# Patient Record
Sex: FEMALE | Race: BLACK OR AFRICAN AMERICAN | NOT HISPANIC OR LATINO | Employment: UNEMPLOYED | URBAN - METROPOLITAN AREA
[De-identification: names, ages, dates, MRNs, and addresses within clinical notes are randomized per-mention and may not be internally consistent; named-entity substitution may affect disease eponyms.]

---

## 2024-07-16 ENCOUNTER — APPOINTMENT (OUTPATIENT)
Dept: RADIOLOGY | Facility: HOSPITAL | Age: 34
End: 2024-07-16
Payer: COMMERCIAL

## 2024-07-16 ENCOUNTER — HOSPITAL ENCOUNTER (EMERGENCY)
Facility: HOSPITAL | Age: 34
Discharge: HOME | End: 2024-07-16
Payer: COMMERCIAL

## 2024-07-16 VITALS
RESPIRATION RATE: 18 BRPM | TEMPERATURE: 96.9 F | OXYGEN SATURATION: 98 % | HEART RATE: 87 BPM | SYSTOLIC BLOOD PRESSURE: 158 MMHG | DIASTOLIC BLOOD PRESSURE: 99 MMHG | WEIGHT: 250 LBS

## 2024-07-16 DIAGNOSIS — S62.307A CLOSED NONDISPLACED FRACTURE OF FIFTH METACARPAL BONE OF LEFT HAND, UNSPECIFIED PORTION OF METACARPAL, INITIAL ENCOUNTER: Primary | ICD-10-CM

## 2024-07-16 PROCEDURE — 73130 X-RAY EXAM OF HAND: CPT | Mod: LEFT SIDE | Performed by: STUDENT IN AN ORGANIZED HEALTH CARE EDUCATION/TRAINING PROGRAM

## 2024-07-16 PROCEDURE — 99284 EMERGENCY DEPT VISIT MOD MDM: CPT | Performed by: PHYSICIAN ASSISTANT

## 2024-07-16 PROCEDURE — 73110 X-RAY EXAM OF WRIST: CPT | Mod: LEFT SIDE | Performed by: STUDENT IN AN ORGANIZED HEALTH CARE EDUCATION/TRAINING PROGRAM

## 2024-07-16 PROCEDURE — 73110 X-RAY EXAM OF WRIST: CPT | Mod: LT

## 2024-07-16 PROCEDURE — 99284 EMERGENCY DEPT VISIT MOD MDM: CPT | Mod: 25

## 2024-07-16 PROCEDURE — 29125 APPL SHORT ARM SPLINT STATIC: CPT | Performed by: PHYSICIAN ASSISTANT

## 2024-07-16 PROCEDURE — 2500000001 HC RX 250 WO HCPCS SELF ADMINISTERED DRUGS (ALT 637 FOR MEDICARE OP): Performed by: PHYSICIAN ASSISTANT

## 2024-07-16 PROCEDURE — 73130 X-RAY EXAM OF HAND: CPT | Mod: LT

## 2024-07-16 PROCEDURE — 29075 APPL CST ELBW FNGR SHORT ARM: CPT | Performed by: PHYSICIAN ASSISTANT

## 2024-07-16 RX ORDER — IBUPROFEN 600 MG/1
600 TABLET ORAL ONCE
Status: COMPLETED | OUTPATIENT
Start: 2024-07-16 | End: 2024-07-16

## 2024-07-16 RX ORDER — ACETAMINOPHEN 500 MG
1000 TABLET ORAL EVERY 8 HOURS PRN
Qty: 30 TABLET | Refills: 0 | Status: SHIPPED | OUTPATIENT
Start: 2024-07-16 | End: 2024-07-26

## 2024-07-16 RX ORDER — IBUPROFEN 600 MG/1
600 TABLET ORAL EVERY 6 HOURS PRN
Qty: 28 TABLET | Refills: 0 | Status: SHIPPED | OUTPATIENT
Start: 2024-07-16 | End: 2024-07-23

## 2024-07-16 ASSESSMENT — COLUMBIA-SUICIDE SEVERITY RATING SCALE - C-SSRS
2. HAVE YOU ACTUALLY HAD ANY THOUGHTS OF KILLING YOURSELF?: NO
6. HAVE YOU EVER DONE ANYTHING, STARTED TO DO ANYTHING, OR PREPARED TO DO ANYTHING TO END YOUR LIFE?: NO
1. IN THE PAST MONTH, HAVE YOU WISHED YOU WERE DEAD OR WISHED YOU COULD GO TO SLEEP AND NOT WAKE UP?: NO

## 2024-07-16 ASSESSMENT — PAIN - FUNCTIONAL ASSESSMENT: PAIN_FUNCTIONAL_ASSESSMENT: 0-10

## 2024-07-16 ASSESSMENT — PAIN SCALES - GENERAL: PAINLEVEL_OUTOF10: 3

## 2024-07-16 NOTE — DISCHARGE INSTRUCTIONS
Rest, Ice, Compress, and Elevate your injury as often as possible.  Please call [phone] for Orthopedic referral for follow up appointments.
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
## 2024-07-16 NOTE — Clinical Note
Michelle Hernandez was seen and treated in our emergency department on 7/16/2024.  She may return to work on 07/24/2024.       If you have any questions or concerns, please don't hesitate to call.      Christin Baptiste PA-C
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
## 2024-07-16 NOTE — ED PROCEDURE NOTE
Procedure  Splint Application    Performed by: Christin Baptiste PA-C  Authorized by: Christin Baptiste PA-C    Consent:     Consent obtained:  Verbal    Consent given by:  Patient    Risks, benefits, and alternatives were discussed: yes      Risks discussed:  Numbness, pain and swelling    Alternatives discussed:  Referral  Universal protocol:     Imaging studies available: yes      Patient identity confirmed:  Verbally with patient  Pre-procedure details:     Distal neurologic exam:  Normal    Distal perfusion: distal pulses strong and brisk capillary refill    Procedure details:     Location:  Hand    Hand location:  L hand    Strapping: no      Cast type:  Short arm    Splint type:  Ulnar gutter    Supplies:  Plaster, elastic bandage, cotton padding and sling    Attestation: Splint applied and adjusted personally by me    Post-procedure details:     Distal neurologic exam:  Normal    Distal perfusion: distal pulses strong and brisk capillary refill      Procedure completion:  Tolerated    Post-procedure imaging: not applicable                 Christin Baptiste PA-C  07/16/24 1623

## 2024-07-26 ENCOUNTER — APPOINTMENT (OUTPATIENT)
Dept: ORTHOPEDIC SURGERY | Facility: CLINIC | Age: 34
End: 2024-07-26
Payer: COMMERCIAL

## 2024-07-26 DIAGNOSIS — S62.307A CLOSED NONDISPLACED FRACTURE OF FIFTH METACARPAL BONE OF LEFT HAND, UNSPECIFIED PORTION OF METACARPAL, INITIAL ENCOUNTER: ICD-10-CM

## 2024-07-26 ASSESSMENT — PAIN DESCRIPTION - DESCRIPTORS: DESCRIPTORS: ACHING

## 2024-07-26 ASSESSMENT — PAIN SCALES - GENERAL: PAINLEVEL_OUTOF10: 6

## 2024-07-26 ASSESSMENT — PAIN - FUNCTIONAL ASSESSMENT: PAIN_FUNCTIONAL_ASSESSMENT: 0-10
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
## 2024-07-26 NOTE — LETTER
July 26, 2024     Patient: Michelle Hernandez   YOB: 1990   Date of Visit: 7/26/2024       To Whom It May Concern:    It is my medical opinion that Michelle Hernandez unable to work until further evaluated on August 23, 2024.    If you have any questions or concerns, please don't hesitate to call.         Sincerely,        Will B Beucler, DO    CC: No Recipients

## 2024-08-20 ENCOUNTER — HOSPITAL ENCOUNTER (OUTPATIENT)
Facility: HOSPITAL | Age: 34
Setting detail: OBSERVATION
Discharge: HOME | End: 2024-08-21
Attending: EMERGENCY MEDICINE | Admitting: EMERGENCY MEDICINE
Payer: COMMERCIAL

## 2024-08-20 ENCOUNTER — CLINICAL SUPPORT (OUTPATIENT)
Dept: EMERGENCY MEDICINE | Facility: HOSPITAL | Age: 34
End: 2024-08-20
Payer: COMMERCIAL

## 2024-08-20 ENCOUNTER — APPOINTMENT (OUTPATIENT)
Dept: RADIOLOGY | Facility: HOSPITAL | Age: 34
End: 2024-08-20
Payer: COMMERCIAL

## 2024-08-20 DIAGNOSIS — R07.9 CHEST PAIN, UNSPECIFIED TYPE: Primary | ICD-10-CM

## 2024-08-20 DIAGNOSIS — I10 HYPERTENSION, UNSPECIFIED TYPE: ICD-10-CM

## 2024-08-20 DIAGNOSIS — R79.89 ELEVATED TROPONIN: ICD-10-CM

## 2024-08-20 LAB
ALBUMIN SERPL BCP-MCNC: 3.9 G/DL (ref 3.4–5)
ALP SERPL-CCNC: 84 U/L (ref 33–110)
ALT SERPL W P-5'-P-CCNC: 17 U/L (ref 7–45)
ANION GAP SERPL CALC-SCNC: 12 MMOL/L (ref 10–20)
AST SERPL W P-5'-P-CCNC: 14 U/L (ref 9–39)
BASOPHILS # BLD AUTO: 0.01 X10*3/UL (ref 0–0.1)
BASOPHILS NFR BLD AUTO: 0.1 %
BILIRUB SERPL-MCNC: 0.3 MG/DL (ref 0–1.2)
BUN SERPL-MCNC: 7 MG/DL (ref 6–23)
CALCIUM SERPL-MCNC: 8.9 MG/DL (ref 8.6–10.6)
CARDIAC TROPONIN I PNL SERPL HS: 49 NG/L (ref 0–34)
CARDIAC TROPONIN I PNL SERPL HS: 56 NG/L (ref 0–34)
CARDIAC TROPONIN I PNL SERPL HS: 66 NG/L (ref 0–34)
CHLORIDE SERPL-SCNC: 104 MMOL/L (ref 98–107)
CO2 SERPL-SCNC: 25 MMOL/L (ref 21–32)
CREAT SERPL-MCNC: 0.87 MG/DL (ref 0.5–1.05)
EGFRCR SERPLBLD CKD-EPI 2021: 90 ML/MIN/1.73M*2
EOSINOPHIL # BLD AUTO: 0.11 X10*3/UL (ref 0–0.7)
EOSINOPHIL NFR BLD AUTO: 1.5 %
ERYTHROCYTE [DISTWIDTH] IN BLOOD BY AUTOMATED COUNT: 12.1 % (ref 11.5–14.5)
GLUCOSE SERPL-MCNC: 83 MG/DL (ref 74–99)
HCT VFR BLD AUTO: 38.3 % (ref 36–46)
HGB BLD-MCNC: 13.2 G/DL (ref 12–16)
IMM GRANULOCYTES # BLD AUTO: 0.02 X10*3/UL (ref 0–0.7)
IMM GRANULOCYTES NFR BLD AUTO: 0.3 % (ref 0–0.9)
LYMPHOCYTES # BLD AUTO: 2.3 X10*3/UL (ref 1.2–4.8)
LYMPHOCYTES NFR BLD AUTO: 31.9 %
MCH RBC QN AUTO: 32.7 PG (ref 26–34)
MCHC RBC AUTO-ENTMCNC: 34.5 G/DL (ref 32–36)
MCV RBC AUTO: 95 FL (ref 80–100)
MONOCYTES # BLD AUTO: 0.44 X10*3/UL (ref 0.1–1)
MONOCYTES NFR BLD AUTO: 6.1 %
NEUTROPHILS # BLD AUTO: 4.32 X10*3/UL (ref 1.2–7.7)
NEUTROPHILS NFR BLD AUTO: 60.1 %
NRBC BLD-RTO: 0 /100 WBCS (ref 0–0)
PLATELET # BLD AUTO: 358 X10*3/UL (ref 150–450)
POTASSIUM SERPL-SCNC: 4.1 MMOL/L (ref 3.5–5.3)
PREGNANCY TEST URINE, POC: NEGATIVE
PROT SERPL-MCNC: 7.7 G/DL (ref 6.4–8.2)
RBC # BLD AUTO: 4.04 X10*6/UL (ref 4–5.2)
SODIUM SERPL-SCNC: 137 MMOL/L (ref 136–145)
WBC # BLD AUTO: 7.2 X10*3/UL (ref 4.4–11.3)

## 2024-08-20 PROCEDURE — 2500000001 HC RX 250 WO HCPCS SELF ADMINISTERED DRUGS (ALT 637 FOR MEDICARE OP): Mod: SE | Performed by: EMERGENCY MEDICINE

## 2024-08-20 PROCEDURE — 93005 ELECTROCARDIOGRAM TRACING: CPT

## 2024-08-20 PROCEDURE — 81025 URINE PREGNANCY TEST: CPT | Performed by: NURSE PRACTITIONER

## 2024-08-20 PROCEDURE — 36415 COLL VENOUS BLD VENIPUNCTURE: CPT | Performed by: NURSE PRACTITIONER

## 2024-08-20 PROCEDURE — 85025 COMPLETE CBC W/AUTO DIFF WBC: CPT | Performed by: NURSE PRACTITIONER

## 2024-08-20 PROCEDURE — 99285 EMERGENCY DEPT VISIT HI MDM: CPT | Mod: 25

## 2024-08-20 PROCEDURE — 84484 ASSAY OF TROPONIN QUANT: CPT | Performed by: PHYSICIAN ASSISTANT

## 2024-08-20 PROCEDURE — 2500000004 HC RX 250 GENERAL PHARMACY W/ HCPCS (ALT 636 FOR OP/ED): Mod: SE | Performed by: NURSE PRACTITIONER

## 2024-08-20 PROCEDURE — 84484 ASSAY OF TROPONIN QUANT: CPT | Performed by: NURSE PRACTITIONER

## 2024-08-20 PROCEDURE — G0378 HOSPITAL OBSERVATION PER HR: HCPCS

## 2024-08-20 PROCEDURE — 71046 X-RAY EXAM CHEST 2 VIEWS: CPT | Mod: FOREIGN READ | Performed by: RADIOLOGY

## 2024-08-20 PROCEDURE — 71046 X-RAY EXAM CHEST 2 VIEWS: CPT

## 2024-08-20 PROCEDURE — 96374 THER/PROPH/DIAG INJ IV PUSH: CPT

## 2024-08-20 PROCEDURE — 84075 ASSAY ALKALINE PHOSPHATASE: CPT | Performed by: NURSE PRACTITIONER

## 2024-08-20 RX ORDER — NAPROXEN SODIUM 220 MG/1
324 TABLET, FILM COATED ORAL ONCE
Status: COMPLETED | OUTPATIENT
Start: 2024-08-20 | End: 2024-08-20

## 2024-08-20 RX ORDER — KETOROLAC TROMETHAMINE 30 MG/ML
30 INJECTION, SOLUTION INTRAMUSCULAR; INTRAVENOUS ONCE
Status: COMPLETED | OUTPATIENT
Start: 2024-08-20 | End: 2024-08-20

## 2024-08-20 RX ORDER — AMLODIPINE BESYLATE 5 MG/1
5 TABLET ORAL ONCE
Status: COMPLETED | OUTPATIENT
Start: 2024-08-20 | End: 2024-08-20

## 2024-08-20 ASSESSMENT — HEART SCORE
ECG: NORMAL
HEART SCORE: 2
AGE: <45
HISTORY: SLIGHTLY SUSPICIOUS
TROPONIN: 1-3 TIMES NORMAL LIMIT
RISK FACTORS: 1-2 RISK FACTORS

## 2024-08-20 ASSESSMENT — COLUMBIA-SUICIDE SEVERITY RATING SCALE - C-SSRS
6. HAVE YOU EVER DONE ANYTHING, STARTED TO DO ANYTHING, OR PREPARED TO DO ANYTHING TO END YOUR LIFE?: NO
2. HAVE YOU ACTUALLY HAD ANY THOUGHTS OF KILLING YOURSELF?: NO
1. IN THE PAST MONTH, HAVE YOU WISHED YOU WERE DEAD OR WISHED YOU COULD GO TO SLEEP AND NOT WAKE UP?: NO

## 2024-08-20 ASSESSMENT — PAIN - FUNCTIONAL ASSESSMENT: PAIN_FUNCTIONAL_ASSESSMENT: 0-10

## 2024-08-20 ASSESSMENT — PAIN SCALES - GENERAL
PAINLEVEL_OUTOF10: 0 - NO PAIN

## 2024-08-20 NOTE — ED PROVIDER NOTES
Chief Complaint   Patient presents with   • Chest Pain       HPI       33 year old female presents to the Emergency Department today complaining of a 3 day history of midsternal chest pain that she describes as sharp in nature, intermittent for the first two days, constant since this am, non-radiating, and varies in intensity. There is no pleuritic or exertional component to such. Denies any associated fever, chills, headache, neck pain, shortness of breath, abdominal pain, nausea, vomiting, diarrhea, constipation, or urinary symptoms. Reports to having a longstanding history of hypertension for which she has not been taking her blood pressure medication for some time. No recent periods of immobilization, long travel, recent surgeries, history of cancer, lower extremity edema/pain, or prior history of DVT/PE.       History provided by:  Patient             Patient History   History reviewed. No pertinent past medical history.  History reviewed. No pertinent surgical history.  No family history on file.  Social History     Tobacco Use   • Smoking status: Not on file   • Smokeless tobacco: Not on file   Substance Use Topics   • Alcohol use: Not on file   • Drug use: Not on file           Physical Exam  Constitutional:       Appearance: Normal appearance.   HENT:      Head: Normocephalic.      Right Ear: External ear normal.      Left Ear: External ear normal.      Nose: Nose normal.      Mouth/Throat:      Mouth: Mucous membranes are moist.      Pharynx: Oropharynx is clear. No oropharyngeal exudate or posterior oropharyngeal erythema.   Eyes:      Conjunctiva/sclera: Conjunctivae normal.      Pupils: Pupils are equal, round, and reactive to light.   Cardiovascular:      Rate and Rhythm: Normal rate and regular rhythm.      Pulses:           Radial pulses are 3+ on the right side and 3+ on the left side.        Dorsalis pedis pulses are 3+ on the right side and 3+ on the left side.      Heart sounds: Normal heart  sounds. No murmur heard.     No friction rub. No gallop.   Pulmonary:      Effort: Pulmonary effort is normal. No respiratory distress.      Breath sounds: Normal breath sounds. No wheezing, rhonchi or rales.   Abdominal:      General: Abdomen is flat. Bowel sounds are normal.      Palpations: Abdomen is soft.      Tenderness: There is no abdominal tenderness. There is no right CVA tenderness, left CVA tenderness, guarding or rebound. Negative signs include German's sign and McBurney's sign.   Musculoskeletal:         General: No swelling or deformity.      Cervical back: Full passive range of motion without pain.      Right lower leg: No edema.      Left lower leg: No edema.   Lymphadenopathy:      Cervical: No cervical adenopathy.   Skin:     Capillary Refill: Capillary refill takes less than 2 seconds.      Coloration: Skin is not jaundiced.      Findings: No rash.   Neurological:      General: No focal deficit present.      Mental Status: She is alert and oriented to person, place, and time. Mental status is at baseline.      Gait: Gait is intact.   Psychiatric:         Mood and Affect: Mood normal.         Behavior: Behavior is cooperative.         Labs Reviewed   SERIAL TROPONIN-INITIAL - Abnormal       Result Value    Troponin I, High Sensitivity (CMC) 56 (*)     Narrative:     Less than 99th percentile of normal range cutoff-  Female and children under 18 years old <35 ng/L; Male <54 ng/L: Negative  Repeat testing should be performed if clinically indicated.     Female and children under 18 years old  ng/L; Male  ng/L:  Consistent with possible cardiac damage and possible increased clinical   risk. Serial measurements may help to assess extent of myocardial damage.     >120 ng/L: Consistent with cardiac damage, increased clinical risk and  myocardial infarction. Serial measurements may help assess extent of   myocardial damage.      NOTE: Children less than 1 year old may have higher baseline  troponin   levels and results should be interpreted in conjunction with the overall   clinical context.    NOTE: Troponin I testing is performed using a different   testing methodology at [facility] than at other   [facility] hospitals. Direct result comparisons should only   be made within the same method.     COMPREHENSIVE METABOLIC PANEL - Normal    Glucose 83      Sodium 137      Potassium 4.1      Chloride 104      Bicarbonate 25      Anion Gap 12      Urea Nitrogen 7      Creatinine 0.87      eGFR 90      Calcium 8.9      Albumin 3.9      Alkaline Phosphatase 84      Total Protein 7.7      AST 14      Bilirubin, Total 0.3      ALT 17     POCT PREGNANCY, URINE - Normal    Preg Test, Ur Negative     CBC WITH AUTO DIFFERENTIAL    WBC 7.2      nRBC 0.0      RBC 4.04      Hemoglobin 13.2      Hematocrit 38.3      MCV 95      MCH 32.7      MCHC 34.5      RDW 12.1      Platelets 358      Neutrophils % 60.1      Immature Granulocytes %, Automated 0.3      Lymphocytes % 31.9      Monocytes % 6.1      Eosinophils % 1.5      Basophils % 0.1      Neutrophils Absolute 4.32      Immature Granulocytes Absolute, Automated 0.02      Lymphocytes Absolute 2.30      Monocytes Absolute 0.44      Eosinophils Absolute 0.11      Basophils Absolute 0.01     TROPONIN SERIES- (INITIAL, 1 HR)    Narrative:     The following orders were created for panel order Troponin I Series, High Sensitivity (0, 1 HR).  Procedure                               Abnormality         Status                     ---------                               -----------         ------                     Troponin I, High Sensiti...[[id number]]  Abnormal            Final result               Troponin, High Sensitivi...[[id number]]                                                   Please view results for these tests on the individual orders.   SERIAL TROPONIN, 1 HOUR       XR chest 2 views   Final Result   No acute process.   Signed by Freeman Cuevas MD                ED Course & MDM   ED Course as of 08/20/24 1400   e Aug 20, 2024   1237 The patient seen and examined with the nurse practitioner/physician assistant. I personally saw the patient and made/approved the management plan and take responsibility for the patient management.    History: 33-year-old -American female with history of hypertension who presents to the emergency department for chest pain.  Has been ongoing for her 3-day.  Will last up to 30 minutes at a time.  Not worsened with exertion.  The patient denied any diaphoresis, shortness of breath associated with the episodes.  No clear exacerbating factor.  Patient had been prescribed antihypertensive medication 2 years ago but has not followed up or and has not been compliant with it.  Patient currently denies having chest pain.  Is a cigarette smoker and social EtOH use.  Denies recreational drug use.  No family history of early heart attacks or unexpected deaths  Exam: Regular rate and rhythm, clear lung sounds bilaterally, no lower extremity swelling  MDM: Patient presented to the emergency department chest pain.  Intermittent.  Vital signs notable for hypertension patient past medical history significant for hypertension and cigarette smoking.  The patient has a first troponin of 56.  Twelve-lead EKG nonischemic.  Chest x-ray did not show evidence of pneumothorax or opacity.  Given the elevated troponin which could be a entering ischemia, will have the patient admitted.  Patient was ordered aspirin in addition to amlodipine, which was the last medication I can find as described to her, this by a emergency department.    Patient twelve-lead EKG interpreted by myself shows sinus rhythm, ventricular rate 84, normal axis, CT interval, normal QRS duration, normal QT, no STEMI.    Clay Hdez DO  Emergency Medicine [[facility]]      ED Course User Index  [[facility]] Justo Hdez DO         Diagnoses as of 08/20/24 1400   Chest pain, unspecified type    Elevated troponin           Medical Decision Making  EKG interpreted by Dr. Chua. Indication: chest pain. Findings: NSR with a ventricular rate of 84, normal axis, normal intervals, and no acute ischemic or injury pattern. Impression: No acute pathology.       Patient was seen and evaluated by Dr. Hdez. Saline lock was established with labs drawn and results as above. Urine pregnancy was negative. Given Toradol with improvement in her pain. Blood counts, electrolytes, kidney function, and liver function were unremarkable. Heart Score- 2 with normal EKG and elevated initial troponin of 56. At this time, we feel that the chest pain may be secondary to acute coronary syndrome. Given Aspirin for such. We find no underlying evidence of an acute infectious process or pneumothorax on CXR. Clinically, we do not feel they are exhibiting signs of pulmonary embolism or thoracic aortic dissection (no connective tissue disorder, no tachycardia, tachypnea, hypoxia, and mediastinum normal in size on CXR). Her blood pressure was treated with Amlodipine. Patient will need to be observed over night for further evaluation of her chest pain. Placement will depend on her repeat troponin that is pending. Care will be signed out to oncoming team for final disposition.    Diagnostic Impression:    1. Acute chest pain with elevated troponin    2. Elevated blood pressure secondary to noncompliance.                Your medication list      You have not been prescribed any medications.           Procedure  Procedures     TANGELA Perla-CNP  08/20/24 1400

## 2024-08-20 NOTE — H&P
"History and Physical  [facility] CLINICAL DECISION  Patient: Michelle Hernandez  MRN: [id number]  : 1990  Date of Evaluation: 2024  ED Provider: Gordon Juarez PA-C      Limitations to history: None  Independent Historian: Yes  External Records Reviewed: Recent and relevant inpatient and outpatient notes in EMR      Patient History:  Michelle Hernandez is a 33 y.o. female with a past medical history of hypertension (noncompliant with anti-hypertensive medication) who is admitted to the Clinical Decision Unit for chest pain.  Patient states that her chest pain began 3 days ago while sitting at the table playing cards.  She describes a \"sharp\" pain located to the midsternal chest area that initially occurred intermittently.  She states that she was driving this morning when the same pain developed and has remained constant.  She reports that the chest pain radiates into the left arm.  She denies any similar chest pain episodes in the past.  She denies shortness of breath cough fever chills nausea vomiting headache syncope dizziness dysphagia back pain abdominal pain extremity pain weakness diaphoresis or any recent illness.  She denies any significant family history of cardiovascular disease.    The acute evaluation included:  Orders Placed This Encounter   Procedures    XR chest 2 views    CBC and Auto Differential    Comprehensive metabolic panel    Troponin I Series, High Sensitivity (0, 1 HR)    Troponin I, High Sensitivity, Initial    Troponin, High Sensitivity, 1 Hour    Adult diet 2-3 grams sodium    POCT pregnancy, urine    ECG 12 lead    Send to CDU    Initiate observation status       I reviewed the below labs and imaging as ordered by the ED provider:  XR chest 2 views   Final Result   No acute process.   Signed by Freeman Cuevas MD          Labs Reviewed   SERIAL TROPONIN-INITIAL - Abnormal       Result Value    Troponin I, High Sensitivity (CMC) 56 (*)     Narrative:     Less than 99th " percentile of normal range cutoff-  Female and children under 18 years old <35 ng/L; Male <54 ng/L: Negative  Repeat testing should be performed if clinically indicated.     Female and children under 18 years old  ng/L; Male  ng/L:  Consistent with possible cardiac damage and possible increased clinical   risk. Serial measurements may help to assess extent of myocardial damage.     >120 ng/L: Consistent with cardiac damage, increased clinical risk and  myocardial infarction. Serial measurements may help assess extent of   myocardial damage.      NOTE: Children less than 1 year old may have higher baseline troponin   levels and results should be interpreted in conjunction with the overall   clinical context.    NOTE: Troponin I testing is performed using a different   testing methodology at [facility] than at other   [facility]. Direct result comparisons should only   be made within the same method.     SERIAL TROPONIN, 1 HOUR - Abnormal    Troponin I, High Sensitivity (CMC) 49 (*)     Narrative:     Less than 99th percentile of normal range cutoff-  Female and children under 18 years old <35 ng/L; Male <54 ng/L: Negative  Repeat testing should be performed if clinically indicated.     Female and children under 18 years old  ng/L; Male  ng/L:  Consistent with possible cardiac damage and possible increased clinical   risk. Serial measurements may help to assess extent of myocardial damage.     >120 ng/L: Consistent with cardiac damage, increased clinical risk and  myocardial infarction. Serial measurements may help assess extent of   myocardial damage.      NOTE: Children less than 1 year old may have higher baseline troponin   levels and results should be interpreted in conjunction with the overall   clinical context.    NOTE: Troponin I testing is performed using a different   testing methodology at [facility] than at other   [facility]. Direct result  comparisons should only   be made within the same method.     COMPREHENSIVE METABOLIC PANEL - Normal    Glucose 83      Sodium 137      Potassium 4.1      Chloride 104      Bicarbonate 25      Anion Gap 12      Urea Nitrogen 7      Creatinine 0.87      eGFR 90      Calcium 8.9      Albumin 3.9      Alkaline Phosphatase 84      Total Protein 7.7      AST 14      Bilirubin, Total 0.3      ALT 17     POCT PREGNANCY, URINE - Normal    Preg Test, Ur Negative     CBC WITH AUTO DIFFERENTIAL    WBC 7.2      nRBC 0.0      RBC 4.04      Hemoglobin 13.2      Hematocrit 38.3      MCV 95      MCH 32.7      MCHC 34.5      RDW 12.1      Platelets 358      Neutrophils % 60.1      Immature Granulocytes %, Automated 0.3      Lymphocytes % 31.9      Monocytes % 6.1      Eosinophils % 1.5      Basophils % 0.1      Neutrophils Absolute 4.32      Immature Granulocytes Absolute, Automated 0.02      Lymphocytes Absolute 2.30      Monocytes Absolute 0.44      Eosinophils Absolute 0.11      Basophils Absolute 0.01     TROPONIN SERIES- (INITIAL, 1 HR)    Narrative:     The following orders were created for panel order Troponin I Series, High Sensitivity (0, 1 HR).  Procedure                               Abnormality         Status                     ---------                               -----------         ------                     Troponin I, High Sensiti...[[id number]]  Abnormal            Final result               Troponin, High Sensitivi...[[id number]]  Abnormal            Final result                 Please view results for these tests on the individual orders.           After discussion with the ED provider, a decision was made to admit the patient to the Clinical Decision Unit.    Upon admission to the Clinical Decision Unit, Mrs. Hernandez is alert and oriented x 4 and appearing no acute distress.  BP is elevated to 171/86 remain vital signs are within normal limits and the patient is not hypoxic.  Medical workup initiated in emergency  "department included EKG, laboratory studies and chest x-ray.  Diagnostic information was obtained, reviewed and discussed with the patient.  EKG reveals a sinus rhythm at a rate of 84 with normal axis, OK interval and normal QRS duration, normal QT, no STEMI.  Initial high-sensitivity serum troponin of 56 with a delta Trope downtrending to 49.  No significant leukocytosis, electrolyte derangement, anemia or KRISTINA.  Chest x-ray shows no acute cardiopulmonary process.  She received aspirin and amlodipine while in the ED today.  Mrs. Hernandez appears to be resting comfortably and has been informed of her medical plan of care and is in agreement.  Patient was placed into the CDU for close observation, continuous telemetry and provocative cardiac testing.    Past History   History reviewed. No pertinent past medical history.  History reviewed. No pertinent surgical history.  Social History     Socioeconomic History    Marital status: Single         Medications/Allergies     Previous Medications    No medications on file     No Known Allergies      Review of Systems  All systems reviewed and otherwise negative, except as stated above in HPI.      Physical Exam     Visit Vitals  /86 (BP Location: Right arm, Patient Position: Lying)   Pulse 60   Temp 36.7 °C (98 °F) (Temporal)   Resp 19   Ht 1.727 m (5' 8\")   Wt 113 kg (250 lb)   SpO2 98%   BMI 38.01 kg/m²   BSA 2.33 m²         Physical exam    VS: As documented in the triage note and EMR flowsheet from this visit were reviewed.    General: Patient is AAOx3, obese, is a good historian, answers questions appropriately    HEENT: head normocephalic, atraumatic, EOMs intact, oropharynx without erythema or exudate, buccal mucosa intact without lesions, nose is patent bilateral    Neck: supple, full ROM, negative for lymphadenopathy, JVD, thyromegaly, tracheal deviation, nuchal rigidity    Pulmonary: Clear to auscultation bilaterally, No wheezing, rales, or rhonchi, no accessory " muscle use, able to speak full clear sentences    Cardiac: Normal rate and rhythm, no murmurs, rubs or gallops    GI: soft, non-tender, non-distended, normoactive bowelsounds in all four quadrants, no masses or organomegaly, no guarding or CVA tenderness noted    Musculoskeletal: full weight bearing, PAIGE, no joint effusions, clubbing or edema noted    Skin: Warm, dry, intact, no lesions or rashes noted, turgor is good.    Neuro: patient follow commands, cranial nerves 2-12 grossly intact, motor strengths 5/5 upper and lower extremities, sensation are symmetrical. No focal deficits.    Psych: Appropriate mood and affect for situation      Consultants  1) N/A      Impression and Plan  In summary, Michelle Hernandez is admitted to the [facility] Clinical Decision Unit for chest pain. Dr. Hdez is the CDU admission attending.    This patient has been risk-stratified based on available history, physical exam, and related study findings. Admission to the observation status for further diagnosis/treatment/monitoring of chest pain is warranted clinically. This extended period of observation is specifically required to determine the need for hospitalization.     The goals of this admission based on the patient’s clinical problem list are:  1) Stable vital signs  2) Cardiac medical workup/ACS rule out    Assessment/ Plan  1) Chest pain  -Continuous telemetry  -Cardiac stress test        When met, appropriate disposition will be arranged.

## 2024-08-20 NOTE — ED TRIAGE NOTES
Pt presents to the ED c/o midsternal cp that radiates to the L arm x 3 days. Denies SOB. Pt is supposed to take meds for HTN but does not take them.

## 2024-08-21 ENCOUNTER — APPOINTMENT (OUTPATIENT)
Dept: CARDIOLOGY | Facility: HOSPITAL | Age: 34
End: 2024-08-21
Payer: COMMERCIAL

## 2024-08-21 VITALS
HEIGHT: 68 IN | RESPIRATION RATE: 17 BRPM | HEART RATE: 70 BPM | DIASTOLIC BLOOD PRESSURE: 79 MMHG | BODY MASS INDEX: 37.89 KG/M2 | SYSTOLIC BLOOD PRESSURE: 146 MMHG | OXYGEN SATURATION: 99 % | TEMPERATURE: 97.5 F | WEIGHT: 250 LBS

## 2024-08-21 LAB
AORTIC VALVE PEAK VELOCITY: 1.4 M/S
ATRIAL RATE: 84 BPM
AV PEAK GRADIENT: 7.8 MMHG
AVA (PEAK VEL): 2.67 CM2
CARDIAC TROPONIN I PNL SERPL HS: 24 NG/L (ref 0–34)
EJECTION FRACTION APICAL 4 CHAMBER: 54.4
EJECTION FRACTION: 61 %
LEFT ATRIUM VOLUME AREA LENGTH INDEX BSA: 36.8 ML/M2
LEFT VENTRICLE INTERNAL DIMENSION DIASTOLE: 4.9 CM (ref 3.5–6)
LEFT VENTRICULAR OUTFLOW TRACT DIAMETER: 2.1 CM
MITRAL VALVE E/A RATIO: 1.29
P AXIS: 43 DEGREES
P OFFSET: 192 MS
P ONSET: 136 MS
PR INTERVAL: 156 MS
Q ONSET: 214 MS
QRS COUNT: 14 BEATS
QRS DURATION: 80 MS
QT INTERVAL: 390 MS
QTC CALCULATION(BAZETT): 460 MS
QTC FREDERICIA: 436 MS
R AXIS: 51 DEGREES
RIGHT VENTRICLE FREE WALL PEAK S': 15 CM/S
RIGHT VENTRICLE PEAK SYSTOLIC PRESSURE: 27.5 MMHG
T AXIS: 53 DEGREES
T OFFSET: 409 MS
TRICUSPID ANNULAR PLANE SYSTOLIC EXCURSION: 3 CM
VENTRICULAR RATE: 84 BPM

## 2024-08-21 PROCEDURE — 84484 ASSAY OF TROPONIN QUANT: CPT | Performed by: PHYSICIAN ASSISTANT

## 2024-08-21 PROCEDURE — G0378 HOSPITAL OBSERVATION PER HR: HCPCS

## 2024-08-21 PROCEDURE — 93306 TTE W/DOPPLER COMPLETE: CPT | Performed by: INTERNAL MEDICINE

## 2024-08-21 PROCEDURE — 99222 1ST HOSP IP/OBS MODERATE 55: CPT

## 2024-08-21 PROCEDURE — 2500000001 HC RX 250 WO HCPCS SELF ADMINISTERED DRUGS (ALT 637 FOR MEDICARE OP): Mod: SE

## 2024-08-21 PROCEDURE — 93005 ELECTROCARDIOGRAM TRACING: CPT

## 2024-08-21 PROCEDURE — 93306 TTE W/DOPPLER COMPLETE: CPT

## 2024-08-21 PROCEDURE — 36415 COLL VENOUS BLD VENIPUNCTURE: CPT | Performed by: PHYSICIAN ASSISTANT

## 2024-08-21 RX ORDER — AMLODIPINE BESYLATE 5 MG/1
5 TABLET ORAL DAILY
Qty: 30 TABLET | Refills: 1 | Status: SHIPPED | OUTPATIENT
Start: 2024-08-21 | End: 2024-10-20

## 2024-08-21 RX ORDER — AMLODIPINE BESYLATE 5 MG/1
5 TABLET ORAL DAILY
Status: DISCONTINUED | OUTPATIENT
Start: 2024-08-21 | End: 2024-08-21 | Stop reason: HOSPADM

## 2024-08-21 RX ORDER — NITROGLYCERIN 0.4 MG/1
0.4 TABLET SUBLINGUAL ONCE
Status: COMPLETED | OUTPATIENT
Start: 2024-08-21 | End: 2024-08-21

## 2024-08-21 ASSESSMENT — PAIN SCALES - GENERAL
PAINLEVEL_OUTOF10: 0 - NO PAIN
PAINLEVEL_OUTOF10: 7
PAINLEVEL_OUTOF10: 0 - NO PAIN

## 2024-08-21 NOTE — CONSULTS
"Reason for Consult: Chest Pain    Subjective   33 y.o. female w/ hypertension who is admitted for chest pain.    Patient states that 3-4 days ago she started having sharp chest pain in the center of her chest while she was playing cards with her friends. The pain subsided after awhile but then yesterday when she was bringing her children to school the pain returned and radiated down her left arm. Patient describes the pain as sharp, located in the center of her sternum. She says nothing in particular brings on the pain and it does not get worse with exertion or positional changes. Patient notes the pain gets slightly better when she takes deep breaths. This was the first time she has ever had chest pain before and yesterday was the first time the pain radiated down her left arm. She denies radiation of the pain anywhere else on her body. She also denies associated headaches, SOB, changes in vision, NV, LH/dizziness, changes in urination, numbness/tingling.     Patient says she was previously diagnosed with HTN a few years ago and prescribed medication but she never took it because she didn't believe that she actually had high blood pressure. She reports no family history of cardiac disease but her mother has HTN and diabetes. She is a current smoker since her early twenties. She drinks alcohol socially and reports occasional marijuana use.     Review of Systems  Pertinent items are noted in HPI.     Objective   Visit Vitals  /85 (BP Location: Right arm, Patient Position: Lying)   Pulse 71   Temp 36.6 °C (97.9 °F) (Temporal)   Resp 18   Ht 1.727 m (5' 8\")   Wt 113 kg (250 lb)   SpO2 99%   BMI 38.01 kg/m²   BSA 2.33 m²          Physical Exam  Constitutional:       Appearance: Normal appearance. She is obese.   HENT:      Head: Normocephalic and atraumatic.   Cardiovascular:      Rate and Rhythm: Normal rate and regular rhythm.      Pulses: Normal pulses.      Heart sounds: Normal heart sounds. No murmur " heard.  Pulmonary:      Effort: Pulmonary effort is normal.      Breath sounds: Normal breath sounds.   Abdominal:      General: There is no distension.      Palpations: Abdomen is soft. There is no mass.      Tenderness: There is no abdominal tenderness.   Musculoskeletal:      Right lower leg: No edema.      Left lower leg: No edema.   Skin:     General: Skin is warm and dry.   Neurological:      General: No focal deficit present.      Mental Status: She is alert and oriented to person, place, and time.   Psychiatric:         Mood and Affect: Mood normal.         Behavior: Behavior normal.         Cardiographics  ECG: normal sinus rhythm, no blocks or conduction defects, no ischemic changes .  Results for orders placed during the hospital encounter of 08/20/24    Transthoracic Echo (TTE) Complete    Narrative  [facility], [address] [place] [address]  Tel [phone] and Fax [phone]    TRANSTHORACIC ECHOCARDIOGRAM REPORT      Patient Name:      MAYDA HORTON            [place] Physician:    [id number] Dawit Gilmore MD  Study Date:        8/21/2024            Ordering Provider:    [id number] MILLI BOSS  MRN/PID:           [id number]             Fellow:  Accession#:        [id number]         Nurse:  Date of Birth/Age: 1990 / 33      Sonographer:          Noreen ross                                      JUAN DANIEL  Gender:            F                    Additional Staff:  Height:            172.72 cm            Admit Date:           8/20/2024  Weight:            113.40 kg            Admission Status:     Inpatient -  Priority discharge  BSA / BMI:         2.25 m2 / 38.01      Encounter#:           [id number]  kg/m2  Blood Pressure:    159/92 mmHg          Department Location:  [facility] Non  Invasive    Study Type:    TRANSTHORACIC ECHO (TTE) COMPLETE  Diagnosis/ICD: Chest pain, unspecified-R07.9  Indication:    Chest pain  CPT Code:      Echo Complete w Full  [id number]    Patient History:  Pertinent History: HTN; Chest pain; Obesity.    Study Detail: The following Echo studies were performed: 2D, M-Mode, Doppler and  color flow. Technically challenging study due to body habitus.      PHYSICIAN INTERPRETATION:  Left Ventricle: Left ventricular ejection fraction is normal, calculated by Mukherjee's biplane at 61%. There are no regional left ventricular wall motion abnormalities. The left ventricular cavity size is normal. Spectral Doppler shows a normal pattern of left ventricular diastolic filling.  Left Atrium: The left atrium is upper limits of normal in size.  Right Ventricle: The right ventricle is normal in size. There is normal right ventricular global systolic function.  Right Atrium: The right atrium is upper limits of normal in size.  Aortic Valve: The aortic valve was not well visualized. There is trace aortic valve regurgitation. The peak instantaneous gradient of the aortic valve is 7.8 mmHg.  Mitral Valve: The mitral valve is normal in structure. There is mild mitral annular calcification. There is trace mitral valve regurgitation.  Tricuspid Valve: The tricuspid valve is structurally normal. There is trace tricuspid regurgitation.  Pulmonic Valve: The pulmonic valve is structurally normal. There is trace pulmonic valve regurgitation.  Pericardium: There is a trivial pericardial effusion.  Aorta: The aortic root is normal. There is no dilatation of the ascending aorta. There is no dilatation of the aortic root.  Pulmonary Artery: The tricuspid regurgitant velocity is 2.37 m/s, and with an estimated right atrial pressure of 5 mmHg, the estimated pulmonary artery pressure is normal with the RVSP at 27.5 mmHg.  Systemic Veins: The inferior vena cava appears to be of normal size.      CONCLUSIONS:  1. Poorly visualized anatomical structures due to suboptimal image quality.  2. Left ventricular ejection fraction is normal, calculated by Mukherjee's biplane at  61%.  3. There is normal right ventricular global systolic function.    QUANTITATIVE DATA SUMMARY:  2D MEASUREMENTS:  Normal Ranges:  IVSd:          1.00 cm  (0.6-1.1cm)  LVPWd:         1.00 cm  (0.6-1.1cm)  LVIDd:         4.90 cm  (3.9-5.9cm)  LVIDs:         3.00 cm  LV Mass Index: 78 g/m2  LVEDV Index:   81 ml/m2  LV % FS        38.8 %    LA VOLUME:  Normal Ranges:  LA Vol A4C:        83.7 ml    (22+/-6mL/m2)  LA Vol A2C:        79.3 ml  LA Vol BP:         82.7 ml  LA Vol Index A4C:  37.2ml/m2  LA Vol Index A2C:  35.3 ml/m2  LA Vol Index BP:   36.8 ml/m2  LA Area A4C:       24.1 cm2  LA Area A2C:       23.1 cm2  LA Major Axis A4C: 5.9 cm  LA Major Axis A2C: 5.7 cm    RA VOLUME BY A/L METHOD:  Normal Ranges:  RA Area A4C: 17.7 cm2    M-MODE MEASUREMENTS:  Normal Ranges:  Ao Root: 3.10 cm (2.0-3.7cm)  LAs:     3.90 cm (2.7-4.0cm)    AORTA MEASUREMENTS:  Normal Ranges:  Ao Sinus, d: 3.21 cm (2.1-3.5cm)  Asc Ao, d:   2.88 cm (2.1-3.4cm)    LV SYSTOLIC FUNCTION BY 2D PLANIMETRY (MOD):  Normal Ranges:  EF-A4C View:    54 % (>=55%)  EF-A2C View:    66 %  EF-Biplane:     61 %  LV EF Reported: 61 %    LV DIASTOLIC FUNCTION:  Normal Ranges:  MV Peak E:        1.15 m/s    (0.7-1.2 m/s)  MV Peak A:        0.89 m/s    (0.42-0.7 m/s)  E/A Ratio:        1.29        (1.0-2.2)  MV e'             0.085 m/s   (>8.0)  MV lateral e'     0.09 m/s  MV medial e'      0.08 m/s  MV A Dur:         124.00 msec  E/e' Ratio:       13.47       (<8.0)  PulmV Sys Igor:    23.90 cm/s  PulmV Higgins Igor:   34.40 cm/s  PulmV S/D Igor:    0.70  PulmV A Revs Igor: 26.60 cm/s  PulmV A Revs Dur: 119.00 msec    MITRAL VALVE:  Normal Ranges:  MV DT: 203 msec (150-240msec)    AORTIC VALVE:  Normal Ranges:  AoV Vmax:      1.40 m/s (<=1.7m/s)  AoV Peak P.8 mmHg (<20mmHg)  LVOT Max Igor:  1.08 m/s (<=1.1m/s)  LVOT VTI:      23.60 cm  LVOT Diameter: 2.10 cm  (1.8-2.4cm)  AoV Area,Vmax: 2.67 cm2 (2.5-4.5cm2)      RIGHT VENTRICLE:  RV Basal 4.44 cm  RV Mid   2.78  "cm  RV Major 8.2 cm  TAPSE:   30.0 mm  RV s'    0.15 m/s    TRICUSPID VALVE/RVSP:  Normal Ranges:  Peak TR Velocity: 2.37 m/s  RV Syst Pressure: 27.5 mmHg (< 30mmHg)  IVC Diam:         1.90 cm    PULMONIC VALVE:  Normal Ranges:  PV Accel Time: 180 msec (>120ms)  PV Max Igor:    1.0 m/s  (0.6-0.9m/s)  PV Max P.1 mmHg    Pulmonary Veins:  PulmV A Revs Dur: 119.00 msec  PulmV A Revs Igor: 26.60 cm/s  PulmV Higgins Igor:   34.40 cm/s  PulmV S/D Igor:    0.70  PulmV Sys Igor:    23.90 cm/s      [id number] Dawit Gilmore MD  Electronically signed on 2024 at 9:15:20 AM        ** Final **       Imaging  Chest x-ray: normal     Lab Review   Lab Results   Component Value Date     2024    K 4.1 2024     2024    CO2 25 2024    BUN 7 2024    CREATININE 0.87 2024    GLUCOSE 83 2024    CALCIUM 8.9 2024     No results found for: \"CKTOTAL\", \"CKMB\", \"CKMBINDEX\", \"TROPONINI\"  Lab Results   Component Value Date    WBC 7.2 2024    HGB 13.2 2024    HCT 38.3 2024    MCV 95 2024     2024     No results found for: \"CHOL\", \"TRIG\", \"HDL\", \"LDLDIRECT\"    Troponin I, High Sensitivity (CMC)   Date/Time Value Ref Range Status   2024 07:21 PM 66 (H) 0 - 34 ng/L Final   2024 01:57 PM 49 (H) 0 - 34 ng/L Final   2024 11:18 AM 56 (H) 0 - 34 ng/L Final         Assessment/Plan   Michelle Hernandez is a 33 year old woman with hypertension admitted for new onset chest pain.     Impression:  Patient has a history of hypertension that has been uncontrolled for the past few years due to medication noncompliance. Currently presenting with new onset intermittent non-exertional chest pain radiating down her left arm that began 3-4 days ago. BP on admission 166/115 and still elevated at 144/85 this morning after amlodipine 5mg x2 and 0.4mg nitroglycerin. Labs significant for troponin 56-->49-->66, EKG NSR, TTE normal global systolic function with LVEF 61%. " Troponin elevation likely a sequelae of myocardial injury in the setting of uncontrolled hypertension. Low concern for acute coronary syndrome at this time. However, it would be reasonable to pursue coronary calcium score in the future for risk stratification. Patient would benefit most from reestablishing with her PCP for management of her blood pressure.     Recommendations:  -Continue amlodipine 5mg  -Consider future coronary calcium score  -Will require outpatient PCP follow up for medication and BP management  -Smoking cessation counseling would be recommended      Justo Alston MD  PGY-1, Internal Medicine    Thank you for the opportunity to contribute to the care of this patient. Above recommendations discussed with Dr. Leach. If further questions arise, please page the [place] cardiology [place]ager [place] [phone] on weekdays 7AM - 6PM and weekends 7AM - 2PM, or at [phone] at all other times.

## 2024-08-21 NOTE — PROGRESS NOTES
Subjective  Michelle Hernandez is a 33 y.o. female on day 1 of admission presenting with Chest pain.   Serial assessments of clinical progress include:  1.) VSS  2.) patient still reports intermittent chest pain, denies shortness of breath, diaphoreses, headache, dizziness, radiating pain to extremities, jaw or back.  3.) Patient remained hemodynamically stable and neurologically intact on the rest of the night.      Objective  VS reviewed  Physical Exam:  GENERAL:  The patient appears non-toxic, obese. Vital signs as documented.     Appearance: Alert, oriented, cooperative, in no acute distress. Well nourished & well hydrated.    HEENT:  Head normocephalic, atraumatic, EOMs intact, PERRLA, Mucous membranes moist. Nares patent without copious rhinorrhea.  Oropharynx moist and clear.  Uvula midline.    Neck: Supple.  No meningismus.  No swelling.  Trachea midline. No lymphadenopathy.    Pulmonary:  Lungs are clear to auscultation, without any respiratory distress.  No wheezing, crackles or rales.  No hypoxia or dyspnea.  Able to speak full sentences, no accessory muscle use.    Cardiac: Regular rate and rhythm. No murmurs, rubs or gallops.  No JVD.    GI:  Soft, obese, non-tender, BS positive x 4 quadrants, No rebound or guarding, no peritoneal signs, no CVA tenderness, no masses or organomegaly.    Musculoskeletal: Symmetrical muscle bulk.  No peripheral edema.  Pulses intact distal.  Able to walk.    Integumentary: Warm, dry and intact.  No pallor or jaundice.  No lesions, rashes or open sores.    NEURO:  No obvious neurological deficits, normal sensation and strength bilaterally.  Speech clear and fluent.  Able to follow commands, CN 2-12 intact.    Psych: Appropriate mood and affect.      Relevant Results  Results for orders placed or performed during the hospital encounter of 08/20/24 (from the past 24 hour(s))   ECG 12 lead   Result Value Ref Range    Ventricular Rate 84 BPM    Atrial Rate 84 BPM    MS Interval 156 ms     QRS Duration 80 ms    QT Interval 390 ms    QTC Calculation(Bazett) 460 ms    P Axis 43 degrees    R Axis 51 degrees    T Axis 53 degrees    QRS Count 14 beats    Q Onset 214 ms    P Onset 136 ms    P Offset 192 ms    T Offset 409 ms    QTC Fredericia 436 ms   POCT pregnancy, urine   Result Value Ref Range    Preg Test, Ur Negative Negative   CBC and Auto Differential   Result Value Ref Range    WBC 7.2 4.4 - 11.3 x10*3/uL    nRBC 0.0 0.0 - 0.0 /100 WBCs    RBC 4.04 4.00 - 5.20 x10*6/uL    Hemoglobin 13.2 12.0 - 16.0 g/dL    Hematocrit 38.3 36.0 - 46.0 %    MCV 95 80 - 100 fL    MCH 32.7 26.0 - 34.0 pg    MCHC 34.5 32.0 - 36.0 g/dL    RDW 12.1 11.5 - 14.5 %    Platelets 358 150 - 450 x10*3/uL    Neutrophils % 60.1 40.0 - 80.0 %    Immature Granulocytes %, Automated 0.3 0.0 - 0.9 %    Lymphocytes % 31.9 13.0 - 44.0 %    Monocytes % 6.1 2.0 - 10.0 %    Eosinophils % 1.5 0.0 - 6.0 %    Basophils % 0.1 0.0 - 2.0 %    Neutrophils Absolute 4.32 1.20 - 7.70 x10*3/uL    Immature Granulocytes Absolute, Automated 0.02 0.00 - 0.70 x10*3/uL    Lymphocytes Absolute 2.30 1.20 - 4.80 x10*3/uL    Monocytes Absolute 0.44 0.10 - 1.00 x10*3/uL    Eosinophils Absolute 0.11 0.00 - 0.70 x10*3/uL    Basophils Absolute 0.01 0.00 - 0.10 x10*3/uL   Comprehensive metabolic panel   Result Value Ref Range    Glucose 83 74 - 99 mg/dL    Sodium 137 136 - 145 mmol/L    Potassium 4.1 3.5 - 5.3 mmol/L    Chloride 104 98 - 107 mmol/L    Bicarbonate 25 21 - 32 mmol/L    Anion Gap 12 10 - 20 mmol/L    Urea Nitrogen 7 6 - 23 mg/dL    Creatinine 0.87 0.50 - 1.05 mg/dL    eGFR 90 >60 mL/min/1.73m*2    Calcium 8.9 8.6 - 10.6 mg/dL    Albumin 3.9 3.4 - 5.0 g/dL    Alkaline Phosphatase 84 33 - 110 U/L    Total Protein 7.7 6.4 - 8.2 g/dL    AST 14 9 - 39 U/L    Bilirubin, Total 0.3 0.0 - 1.2 mg/dL    ALT 17 7 - 45 U/L   Troponin I, High Sensitivity, Initial   Result Value Ref Range    Troponin I, High Sensitivity (CMC) 56 (H) 0 - 34 ng/L   Troponin, High  Sensitivity, 1 Hour   Result Value Ref Range    Troponin I, High Sensitivity (CMC) 49 (H) 0 - 34 ng/L   Troponin I, High Sensitivity   Result Value Ref Range    Troponin I, High Sensitivity (CMC) 66 (H) 0 - 34 ng/L       Imaging Results  ECG 12 lead    Result Date: 8/21/2024  Normal sinus rhythm Normal ECG No previous ECGs available See ED provider note for full interpretation and clinical correlation Confirmed by Sejal Carmen ([id number]) on 8/21/2024 3:20:17 AM    XR chest 2 views    Result Date: 8/20/2024  STUDY: Chest Radiographs;  8/20/2024 at 11:26 INDICATION: Chest pain. COMPARISON: None Available ACCESSION NUMBER(S): [id number] ORDERING CLINICIAN: JONO SUMMERS TECHNIQUE:  Frontal and lateral chest. FINDINGS: CARDIOMEDIASTINAL SILHOUETTE: Cardiomediastinal silhouette is normal in size and configuration.  LUNGS: Lungs are clear.  ABDOMEN: No remarkable upper abdominal findings.  BONES: No acute osseous changes.    No acute process. Signed by Freeman Cuevas MD      Medications:          Assessment/Plan     Michelle Hernandez continues to be managed in accordance with the CDU clinical guidelines for chest pain. An update of their clinical problem list included:  1.)  Chest pain   ->Continuous telemetry  ->Cardiac stress test  -> Echo  -> Nitroglycerin    2.) hypertension  -> Amlodipine 5 mg p.o. daily    We will observe the patient for the following endpoints:   1.) Stable vitals   2.)  Symptomatic improvement  3.)  Clear    When met, appropriate disposition will be arranged.    Michelle Hernandez has been admitted to the CDU for 10 hours. I spent 25 minutes in the professional and overall care of this patient   @OBS@    Sejal Carmen, APRN-CNP  [facility]  Emergency Department  Extension [phone]

## 2024-08-21 NOTE — SIGNIFICANT EVENT
Michelle Hernandez is a 33-year-old female who was evaluated in the emergency department for chest pain.  She was placed into the clinical decision unit for additional risks stratification and provocative cardiac testing.  Initially patient's high-sensitivity serial troponins were elevated as follow 66/49/56.  Initial planning was to obtain a cardiac stress test however in lieu of the patient's ongoing chest pain and uptrending troponins, this plan was discontinued.  I placed a consult to cardiology regarding the patient's clinical findings and she was evaluated and it was determined that the patient symptoms and lab findings were likely secondary to hypertension.  Repeat troponin was ordered and was down trended to 24 currently.  Cardiology did not recommend any additional testing at this time, but did recommend blood pressure control measures.  Patient will be discharged home with a physician referral placed at the time of discharge.  She will be provided with prescription for amlodipine 5 mg daily and advised to return to the emergency department with any new or worsening symptoms or for any other concerns.  I discussed this plan of care with Mrs. Hernandez and she verbalized understanding and is in agreement and was discharged in stable condition.

## 2024-08-21 NOTE — PROGRESS NOTES
Disposition Note  [facility] CLINICAL DECISION  Patient: Michelle Hernandez  MRN: [id number]  : 1990  Date of Evaluation: 2024  ED Provider: Gordon Juarez PA-C      Limitations to history: None  Independent Historian: Yes  External Records Reviewed: Recent and relevant inpatient and outpatient notes in EMR      Subjective:    Michelle Hernandez is a 33 y.o. female has undergone comprehensive diagnostic evaluation and therapeutic management in accordance with the CDU guidelines for chest pain. Based on Mrs. Galaviz clinical response and diagnostic information during this period of observation, it has been determined that the patient will be admitted to the hospital.    The acute evaluation included:  Orders Placed This Encounter   Procedures    XR chest 2 views    CBC and Auto Differential    Comprehensive metabolic panel    Troponin I Series, High Sensitivity (0, 1 HR)    Troponin I, High Sensitivity, Initial    Troponin, High Sensitivity, 1 Hour    Troponin I, High Sensitivity    NPO Diet; Effective midnight    Inpatient consult to cardiology    POCT pregnancy, urine    ECG 12 lead    Transthoracic Echo (TTE) Limited    Transthoracic Echo (TTE) Complete    Send to CDU    Initiate observation status         Placed in observation at: 1713       Past History   History reviewed. No pertinent past medical history.  History reviewed. No pertinent surgical history.  Social History     Socioeconomic History    Marital status: Single         Medications/Allergies     Previous Medications    No medications on file     No Known Allergies      Review of Systems  All systems reviewed and otherwise negative, except as stated above in HPI.    Diagnostics reviewed by Gordon Juarez PA-C     Labs:  Results for orders placed or performed during the hospital encounter of 24   CBC and Auto Differential   Result Value Ref Range    WBC 7.2 4.4 - 11.3 x10*3/uL    nRBC 0.0 0.0 - 0.0 /100 WBCs    RBC 4.04 4.00 - 5.20  x10*6/uL    Hemoglobin 13.2 12.0 - 16.0 g/dL    Hematocrit 38.3 36.0 - 46.0 %    MCV 95 80 - 100 fL    MCH 32.7 26.0 - 34.0 pg    MCHC 34.5 32.0 - 36.0 g/dL    RDW 12.1 11.5 - 14.5 %    Platelets 358 150 - 450 x10*3/uL    Neutrophils % 60.1 40.0 - 80.0 %    Immature Granulocytes %, Automated 0.3 0.0 - 0.9 %    Lymphocytes % 31.9 13.0 - 44.0 %    Monocytes % 6.1 2.0 - 10.0 %    Eosinophils % 1.5 0.0 - 6.0 %    Basophils % 0.1 0.0 - 2.0 %    Neutrophils Absolute 4.32 1.20 - 7.70 x10*3/uL    Immature Granulocytes Absolute, Automated 0.02 0.00 - 0.70 x10*3/uL    Lymphocytes Absolute 2.30 1.20 - 4.80 x10*3/uL    Monocytes Absolute 0.44 0.10 - 1.00 x10*3/uL    Eosinophils Absolute 0.11 0.00 - 0.70 x10*3/uL    Basophils Absolute 0.01 0.00 - 0.10 x10*3/uL   Comprehensive metabolic panel   Result Value Ref Range    Glucose 83 74 - 99 mg/dL    Sodium 137 136 - 145 mmol/L    Potassium 4.1 3.5 - 5.3 mmol/L    Chloride 104 98 - 107 mmol/L    Bicarbonate 25 21 - 32 mmol/L    Anion Gap 12 10 - 20 mmol/L    Urea Nitrogen 7 6 - 23 mg/dL    Creatinine 0.87 0.50 - 1.05 mg/dL    eGFR 90 >60 mL/min/1.73m*2    Calcium 8.9 8.6 - 10.6 mg/dL    Albumin 3.9 3.4 - 5.0 g/dL    Alkaline Phosphatase 84 33 - 110 U/L    Total Protein 7.7 6.4 - 8.2 g/dL    AST 14 9 - 39 U/L    Bilirubin, Total 0.3 0.0 - 1.2 mg/dL    ALT 17 7 - 45 U/L   Troponin I, High Sensitivity, Initial   Result Value Ref Range    Troponin I, High Sensitivity (CMC) 56 (H) 0 - 34 ng/L   Troponin, High Sensitivity, 1 Hour   Result Value Ref Range    Troponin I, High Sensitivity (CMC) 49 (H) 0 - 34 ng/L   Troponin I, High Sensitivity   Result Value Ref Range    Troponin I, High Sensitivity (CMC) 66 (H) 0 - 34 ng/L   POCT pregnancy, urine   Result Value Ref Range    Preg Test, Ur Negative Negative   ECG 12 lead   Result Value Ref Range    Ventricular Rate 84 BPM    Atrial Rate 84 BPM    AL Interval 156 ms    QRS Duration 80 ms    QT Interval 390 ms    QTC Calculation(Bazett) 460 ms  "   P Axis 43 degrees    R Axis 51 degrees    T Axis 53 degrees    QRS Count 14 beats    Q Onset 214 ms    P Onset 136 ms    P Offset 192 ms    T Offset 409 ms    QTC Fredericia 436 ms   Transthoracic Echo (TTE) Complete   Result Value Ref Range    AV pk dennis 1.40 m/s    LVOT diam 2.10 cm    MV E/A ratio 1.29     LA vol index A/L 36.8 ml/m2    Tricuspid annular plane systolic excursion 3.0 cm    LV EF 61 %    RV free wall pk S' 15.00 cm/s    LVIDd 4.90 cm    RVSP 27.5 mmHg    Aortic Valve Area by Continuity of Peak Velocity 2.67 cm2    AV pk grad 7.8 mmHg    LV A4C EF 54.4      Radiographs:  Transthoracic Echo (TTE) Complete   Final Result      XR chest 2 views   Final Result   No acute process.   Signed by Freeman Cuevas MD      Transthoracic Echo (TTE) Limited    (Results Pending)           Physical Exam     Visit Vitals  /85 (BP Location: Right arm, Patient Position: Lying)   Pulse 71   Temp 36.6 °C (97.9 °F) (Temporal)   Resp 18   Ht 1.727 m (5' 8\")   Wt 113 kg (250 lb)   SpO2 99%   BMI 38.01 kg/m²   BSA 2.33 m²       Physical exam  VS: As documented in the triage note and EMR flowsheet from this visit were reviewed.    General: Patient is AAOx3, appears well, obese, is a good historian, answers questions appropriately    HEENT: head normocephalic, atraumatic, PERRLA, EOMs intact, oropharynx without erythema or exudate, buccal mucosa intact without lesions, nose is patent bilateral    Neck: supple, full ROM, negative for lymphadenopathy, JVD, thyromegaly, tracheal deviation, nuchal rigidity    Pulmonary: Clear to auscultation bilaterally, No wheezing, rales, or rhonchi, no accessory muscle use, able to speak full clear sentences    Cardiac: Normal rate and rhythm, no murmurs, rubs or gallops    GI: soft, non-tender, non-distended, normoactive bowelsounds in all four quadrants, no masses or organomegaly, no guarding or CVA tenderness noted    Musculoskeletal: full weight bearing, PAIGE, no joint effusions, " clubbing or edema noted    Skin: Warm, dry, intact, no lesions or rashes noted, turgor is good.    Neuro: patient follow commands, cranial nerves 2-12 grossly intact, motor strengths 5/5 upper and lower extremities, sensation are symmetrical. No focal deficits.    Psych: Appropriate mood and affect for situation      Consultants  1) Cardiology: Please see EMR for details      Impression and Plan    In summary, Michelle Hernandez has been cared for according to the standard [facility] Clinical Decision Unit observation protocol for Chest pain. This extended period of observation was specifically required to determine the need for hospitalization. Prior to discharge from observation, the final physical exam is documented above.     Significant events during the course of observation based on the goals of the clinical problem list include:   1) Remained stable  2) Uptrending high-sensitivity serum troponins    Based on the patient's condition and test results, the patient will be admitted to the hospital    Total length of observation was 16 hours. Dr. Guthrie is the CDU disposition attending.      Discharge Diagnosis  Chest pain    Issues Requiring Follow-Up  See below    Discharge Meds     Your medication list      You have not been prescribed any medications.         Test Results Pending At Discharge  Pending Labs       No current pending labs.            Hospital Course   Mrs. Hernandez was admitted to the clinical decision unit for chest pain.  Medical workup included EKG, laboratory studies and imaging.  Diagnostic information was obtained, reviewed and discussed with the patient.  EKG revealed normal sinus rhythm at a rate of 84 without acute ST segment changes or arrhythmias.  Serial high-sensitivity serum troponins of 56/49/66.  No significant leukocytosis, electrolyte derangement, anemia, KRISTINA or renal function panel abnormalities.  Chest x-ray shows no acute cardiopulmonary pathology.  Complete  echocardiogram reveals a EF of 61%.  Initial plan was to obtain provocative cardiac testing however with the latest sound troponin uptrending this intervention was deferred.  Patient did receive aspirin and nitroglycerin with minimal symptomatic improvement.  Plan to admit the patient to the hospital for further evaluation and management.  I discussed this plan of care with the patient and she verbalized understanding and is in agreement will be admitted in stable condition.    Assessment/plan    Chest pain  -Admission to the hospital    Outpatient Follow-Up  Future Appointments   Date Time Provider Department Center   8/23/2024 10:30 AM Will DO [id number]         Gordon Juarez PA-C

## 2024-08-22 LAB
ATRIAL RATE: 61 BPM
P AXIS: 46 DEGREES
P OFFSET: 201 MS
P ONSET: 140 MS
PR INTERVAL: 154 MS
Q ONSET: 217 MS
QRS COUNT: 10 BEATS
QRS DURATION: 92 MS
QT INTERVAL: 448 MS
QTC CALCULATION(BAZETT): 450 MS
QTC FREDERICIA: 450 MS
R AXIS: 80 DEGREES
T AXIS: 41 DEGREES
T OFFSET: 441 MS
VENTRICULAR RATE: 61 BPM

## 2024-08-23 ENCOUNTER — APPOINTMENT (OUTPATIENT)
Dept: ORTHOPEDIC SURGERY | Facility: CLINIC | Age: 34
End: 2024-08-23
Payer: COMMERCIAL

## 2025-07-15 ENCOUNTER — HOSPITAL ENCOUNTER (EMERGENCY)
Facility: HOSPITAL | Age: 35
Discharge: LEFT BEFORE DISCHARGE | End: 2025-07-15
Payer: COMMERCIAL

## 2025-07-15 VITALS
BODY MASS INDEX: 42.68 KG/M2 | WEIGHT: 250 LBS | DIASTOLIC BLOOD PRESSURE: 112 MMHG | OXYGEN SATURATION: 97 % | SYSTOLIC BLOOD PRESSURE: 163 MMHG | RESPIRATION RATE: 16 BRPM | HEART RATE: 83 BPM | HEIGHT: 64 IN | TEMPERATURE: 96.6 F

## 2025-07-15 PROCEDURE — 4500999001 HC ED NO CHARGE

## 2025-07-15 ASSESSMENT — PAIN DESCRIPTION - LOCATION: LOCATION: ABDOMEN

## 2025-07-15 ASSESSMENT — PAIN - FUNCTIONAL ASSESSMENT: PAIN_FUNCTIONAL_ASSESSMENT: 0-10

## 2025-07-15 ASSESSMENT — PAIN SCALES - GENERAL: PAINLEVEL_OUTOF10: 7

## 2025-07-15 NOTE — ED TRIAGE NOTES
Pt presents to ED for vomiting and constipation. Pt states she went out last night, had foods and drinks, not sure if it is food poisoning. Pt denies other medical complaints.